# Patient Record
(demographics unavailable — no encounter records)

---

## 2025-01-15 NOTE — HISTORY OF PRESENT ILLNESS
[FreeTextEntry1] : [facility]: [facility] 48-hour post discharge follow-up  Operation Date: 1/9/25 s/p Robotic assisted mitral valve repair (32 mm band): Primary Surgeon/Attending MD: Dr. Tineo   46 yo male with pmh of mitral valve prolapse with severe mitral valve regurgitation was referred by Dr. Negrete for mitral valve repair. He presented to [facility] on 1/9 and underwent a RA mitral valve repair (32 mm band). Intra-operative course uncomplicated. He arrived to the CTICU extubated. His post-op echo was complete and showed trace MR. On POD 2, his jsesica drain was removed. He had episode of atrial fibrillation which required 2 amio boluses and was started on a PO load. Overnight, he converted into a first degree block. Discharged to home on 1/13/2025 on Medrol garret and MCOT.   Seen by [facility] for post discharge follow-up, he is in NAD, ambulating independently. He endorses neck, shoulder and right groin pain since discharge, rates pain as 5/10, unrelieved by current pain medications, states neck and shoulder pain is worse at night, groin pain is exacerbated by walking relieved on rest, denies calf or lower extremity pain with walking/rest. He appears euvolemic on exam, no gait abnormalities, BLE within normal limits, no erythema, tenderness or swelling, right groin cannulation site with mild edema, healing well soft with no pain on light palpation.

## 2025-01-15 NOTE — PHYSICAL EXAM
[] : no respiratory distress [Auscultation Breath Sounds / Voice Sounds] : lungs were clear to auscultation bilaterally [Oriented To Time, Place, And Person] : oriented to person, place, and time [Impaired Insight] : insight and judgment were intact [Affect] : the affect was normal [FreeTextEntry1] : right groin cannulation site healing well, soft non tender, KUHN x 4

## 2025-01-15 NOTE — ASSESSMENT
[FreeTextEntry1] : Patient recovering well at home s/p MV Repair, accompanied by spouse Shauna Yip. Reviewed all medications and dosages with patient understanding. Patient has all medications in home and is taking as prescribed. Pain controlled with current medication regimen. No new symptoms, issues or concerns. Reports ambulating around home independently, without the use if assistive device. Denies chest pain, SOB/VELASQUEZ, nausea/vomiting, constipation/diarrhea.   PLAN OF CARE  -Start IBU 600mg q4-6H as needed for musculoskeletal neck and shoulder pain. Take with food; START Protonix for GI prophylaxis on Medrol garret/post surgical.   -Per patient appointment with Cardiologist Dr. Negrete on 1/18/2025.    -Regular diet; low sodium  - IS use and return demonstration done patient pulling TV of 2000; encouraged IS use 10x q1h to improve circulation and breathing.   -Shower let water run over incision use antibacterial soap and pat dry; avoid all creams, ointments or lotions leave open to air.   -Encourage increased ambulation to include outdoors; avoiding extreme temperatures.  -Start daily weights today; call immediately for weight gain >3lbs in a day/5lbs in a week.  -TAKE 1/2 tablet of Oxycodone 2.5mg along with Tylenol 2 tabs (650mg) at night for pain scale >5; this will provide more effective pain relief with least amount of narcotic. Can take additional Oxy 2.5mg if no relief within one hour.  [facility] team will continue to follow up with patient's status. NP/CCC roles explained with patient understanding, contact information provided. Patient agrees to call with any questions, issues or concerns. Worsening symptoms reviewed with patient with reiteration and understanding.

## 2025-01-16 NOTE — REASON FOR VISIT
[de-identified] : Robotic, MV repair w/32mm Band [de-identified] : 1/9/25 [de-identified] : intraop uncomplicated. extubated in OR. Postop had 1 episode of PAF, converted with amio and BB. 1/10/25 Echo with trace MR. Patient discharged home on 1/12.  Chest xray XX

## 2025-01-31 NOTE — PHYSICAL EXAM
[] : no respiratory distress [Auscultation Breath Sounds / Voice Sounds] : lungs were clear to auscultation bilaterally [Heart Rate And Rhythm] : heart rate was normal and rhythm regular [Heart Sounds] : normal S1 and S2 [Heart Sounds Gallop] : no gallops [Heart Sounds Pericardial Friction Rub] : no pericardial rub [Clean] : clean [Dry] : dry [Healing Well] : healing well [No Edema] : no edema [FreeTextEntry2] : right

## 2025-01-31 NOTE — REASON FOR VISIT
[de-identified] : Robotic, MV repair w/32mm Band [de-identified] : 1/9/25 [de-identified] : intraop uncomplicated. extubated in OR. Postop had 1 episode of PAF, converted with amio and BB. 1/10/25 Echo with trace MR. Patient discharged home on 1/12.  Patient is recuperating well from surgery. He is ambulating and increasing his activities daily. Patient denies fever, chills, dizziness, syncope, shortness of breath, chest pain, palpitations or peripheral edema. MCOT reviewed: NSR with 1st AVB; no episodes of afib.

## 2025-01-31 NOTE — END OF VISIT
[FreeTextEntry3] : I, Dr. SMITH [facility], personally performed the evaluation and management (E/M) services for this established patient who presents today with (a) new problem(s)/exacerbation of (an) existing condition(s).  That E/M includes conducting the clinically appropriate interval history &/or exam, assessing all new/exacerbated conditions, and establishing a new plan of care.  Today, my ANGEL LUIS, was here to observe &/or participate in the visit & follow plan of care established by me.

## 2025-01-31 NOTE — ASSESSMENT
[FreeTextEntry1] : - Follow up with PCP/Cardiologist Dr. Negrete.  - Continue current medication regimen. Lasix and K-dur completed.  - Continue to increase activity and walk daily as tolerated. Continue to use incentive spirometer.  - No driving or strenuous activity for six weeks after surgery. Avoid lifting >10 to 15lbs for first two months after surgery.  - Continue to use compression stockings. Keep legs elevated above heart when resting/sitting/sleeping. - Call MD if you experience fever, fatigue, dizziness, confusion, syncope, shortness of breath, chest pain not relieved with analgesics, increased redness/drainage from incision. - Patient discharged from CTS service.

## 2025-01-31 NOTE — END OF VISIT
[FreeTextEntry3] : I, Dr. SMITH [facility], personally performed the evaluation and management (E/M) services for this established patient who presents today with (a) new problem(s)/exacerbation of (an) existing condition(s).  That E/M includes conducting the clinically appropriate interval history &/or exam, assessing all new/exacerbated conditions, and establishing a new plan of care.  Today, my ANGEL LUIS, was here to observe &/or participate in the visit & follow plan of care established by me.

## 2025-01-31 NOTE — END OF VISIT
[FreeTextEntry3] : I, Dr. SMITH [facility], personally performed the evaluation and management (E/M) services for this established patient who presents today with (a) new problem(s)/exacerbation of (an) existing condition(s).  That E/M includes conducting the clinically appropriate interval history &/or exam, assessing all new/exacerbated conditions, and establishing a new plan of care.  Today, my ANGEL LUIS, was here to observe &/or participate in the visit & follow plan of care established by me.

## 2025-01-31 NOTE — REASON FOR VISIT
[de-identified] : Robotic, MV repair w/32mm Band [de-identified] : 1/9/25 [de-identified] : intraop uncomplicated. extubated in OR. Postop had 1 episode of PAF, converted with amio and BB. 1/10/25 Echo with trace MR. Patient discharged home on 1/12.  Patient is recuperating well from surgery. He is ambulating and increasing his activities daily. Patient denies fever, chills, dizziness, syncope, shortness of breath, chest pain, palpitations or peripheral edema. MCOT reviewed: NSR with 1st AVB; no episodes of afib.

## 2025-01-31 NOTE — REASON FOR VISIT
[de-identified] : Robotic, MV repair w/32mm Band [de-identified] : 1/9/25 [de-identified] : intraop uncomplicated. extubated in OR. Postop had 1 episode of PAF, converted with amio and BB. 1/10/25 Echo with trace MR. Patient discharged home on 1/12.  Patient is recuperating well from surgery. He is ambulating and increasing his activities daily. Patient denies fever, chills, dizziness, syncope, shortness of breath, chest pain, palpitations or peripheral edema. MCOT reviewed: NSR with 1st AVB; no episodes of afib.